# Patient Record
Sex: MALE | Race: WHITE | NOT HISPANIC OR LATINO | URBAN - METROPOLITAN AREA
[De-identification: names, ages, dates, MRNs, and addresses within clinical notes are randomized per-mention and may not be internally consistent; named-entity substitution may affect disease eponyms.]

---

## 2018-02-19 ENCOUNTER — TRANSFERRED RECORDS (OUTPATIENT)
Dept: HEALTH INFORMATION MANAGEMENT | Facility: CLINIC | Age: 23
End: 2018-02-19

## 2018-03-05 ENCOUNTER — TRANSFERRED RECORDS (OUTPATIENT)
Dept: HEALTH INFORMATION MANAGEMENT | Facility: CLINIC | Age: 23
End: 2018-03-05

## 2018-03-12 ENCOUNTER — TRANSFERRED RECORDS (OUTPATIENT)
Dept: HEALTH INFORMATION MANAGEMENT | Facility: CLINIC | Age: 23
End: 2018-03-12

## 2018-03-21 ENCOUNTER — TRANSFERRED RECORDS (OUTPATIENT)
Dept: HEALTH INFORMATION MANAGEMENT | Facility: CLINIC | Age: 23
End: 2018-03-21

## 2018-03-27 ENCOUNTER — MEDICAL CORRESPONDENCE (OUTPATIENT)
Dept: HEALTH INFORMATION MANAGEMENT | Facility: CLINIC | Age: 23
End: 2018-03-27

## 2018-04-05 ENCOUNTER — OFFICE VISIT (OUTPATIENT)
Dept: ORTHOPEDICS | Facility: CLINIC | Age: 23
End: 2018-04-05
Payer: COMMERCIAL

## 2018-04-05 VITALS — HEIGHT: 71 IN | BODY MASS INDEX: 24.12 KG/M2 | WEIGHT: 172.3 LBS

## 2018-04-05 DIAGNOSIS — M89.9 BONE LESION: Primary | ICD-10-CM

## 2018-04-05 NOTE — PROGRESS NOTES
[facility], Orthopaedic Oncology Surgery Consultation  by Yaya Devries M.D.    [place] VANDANA Duncan MRN# [id number]   Age: 22 year old YOB: 1995     Requesting physician: Mark Soto DPM, [facility]  Hardy Barth, PCP  Mark Mesa, Radiology [facility]            Assessment and Plan:   Assessment:  #1 right foot fourth metatarsal lesion.  Differential diagnosis would be osteoid osteoma and less likely other types of benign lesions such as osteoblastoma.  Aging is typical for an osteoid osteoma however.     Plan:  I have discussed management management options consisting of either radiofrequency ablation of the osteoid osteoma or serial observation.  Natural history of osteoid osteomas is for pain to resolve spontaneously although this may take 1-2 years time.  Pain can be managed with anti-inflammatory agents or aspirin.  However most cases, patients will like to proceed with a radiofrequency ablation procedure as this is 90-95% effective in completely alleviating pain.  This would be an outpatient procedure using intraoperative CT guidance to place a radio frequency probe into the lesion for eradication.  We discussed risks benefits alternatives of the surgical procedure.    Patient will consider his treatment options and will make a decision notify us of how he would like to proceed.           History of Present Illness:   22 year old male  chief complaint    1 year hx of progressive pain in R foot.  Saw MD on 2/2018, XR / MRI done in 3/5/18. Saw podiatrist Dr. Soto at [facility] who initially advised surgical resection with bone grafting, then advised 2nd opinion here at .    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         Physical Exam:  "    EXAMINATION pertinent findings:   VITAL SIGNS: Height 1.81 m (5' 11.25\"), weight 78.2 kg (172 lb 4.8 oz).  Body mass index is 23.86 kg/(m^2).  RESP: non labored breathing   ABD: benign   SKIN: grossly normal   LYMPHATIC: grossly normal   NEURO: grossly normal   VASCULAR: satisfactory perfusion of all extremities   MUSCULOSKELETAL:   Gait : normal  Hips, knees full ROM.  R foot: no palpable mass.  No focal tenderness or swelling.  No erythema             Data:   All laboratory data reviewed  All imaging studies reviewed by me               Yaya Devries MD  [facility] Family Professor  Oncology and Adult Reconstructive Surgery  Dept Orthopaedic Surgery, [facility]  [phone] office, [phone] pager  [email or web address]            DATA for DOCUMENTATION:         Past Medical History:   There is no problem list on file for this patient.    No past medical history on file.    Also see scanned health assessment forms.       Past Surgical History:   No past surgical history on file.         Social History:     Social History     Social History     Marital status: Single     Spouse name: N/A     Number of children: N/A     Years of education: N/A     Occupational History     Not on file.     Social History Main Topics     Smoking status: Never Smoker     Smokeless tobacco: Never Used     Alcohol use Yes     Drug use: No     Sexual activity: Yes     Partners: Female     Birth control/ protection: Pull-out method, Condom     Other Topics Concern     Not on file     Social History Narrative     No narrative on file            Family History:       Family History   Problem Relation Age of Onset     DIABETES Maternal Grandmother      Hypertension Maternal Grandmother      Colon Cancer Maternal Grandmother      Hypertension Paternal Grandmother      Hypertension Maternal Grandfather      Hypertension Paternal Grandfather      Hyperlipidemia Father             Medications:     Current Outpatient Prescriptions "   Medication Sig     Acetaminophen (TYLENOL PO)      No current facility-administered medications for this visit.               Review of Systems:   A comprehensive 10 point review of systems (constitutional, ENT, cardiac, peripheral vascular, lymphatic, respiratory, GI, , Musculoskeletal, skin, Neurological) was performed and found to be negative except as described in this note.     See intake form completed by patient    Answers for HPI/ROS submitted by the patient on 4/4/2018   General Symptoms: No  Skin Symptoms: No  HENT Symptoms: No  EYE SYMPTOMS: No  HEART SYMPTOMS: No  LUNG SYMPTOMS: No  INTESTINAL SYMPTOMS: No  URINARY SYMPTOMS: No  REPRODUCTIVE SYMPTOMS: No  SKELETAL SYMPTOMS: No  BLOOD SYMPTOMS: No  NERVOUS SYSTEM SYMPTOMS: No  MENTAL HEALTH SYMPTOMS: No

## 2018-04-05 NOTE — LETTER
4/5/2018       RE: Greg Duncan  [address] [place] [address]     Dear Colleague,    Thank you for referring your patient, Greg Duncan, to the [facility] ORTHOP[facility] at [facility]. Please see a copy of my visit note below.        [facility], Orthopaedic Oncology Surgery Consultation  by Yaya Devries M.D.    Greg Duncan MRN# [id number]   Age: 22 year old YOB: 1995     Requesting physician: Makr Soto DPM, [facility]  Hardy Barth, PCP  Mark Mesa, Radiology [facility]            Assessment and Plan:   Assessment:  #1 right foot fourth metatarsal lesion.  Differential diagnosis would be osteoid osteoma and less likely other types of benign lesions such as osteoblastoma.  Aging is typical for an osteoid osteoma however.     Plan:  I have discussed management management options consisting of either radiofrequency ablation of the osteoid osteoma or serial observation.  Natural history of osteoid osteomas is for pain to resolve spontaneously although this may take 1-2 years time.  Pain can be managed with anti-inflammatory agents or aspirin.  However most cases, patients will like to proceed with a radiofrequency ablation procedure as this is 90-95% effective in completely alleviating pain.  This would be an outpatient procedure using intraoperative CT guidance to place a radio frequency probe into the lesion for eradication.  We discussed risks benefits alternatives of the surgical procedure.    Patient will consider his treatment options and will make a decision notify us of how he would like to proceed.           History of Present Illness:   22 year old male  chief complaint    1 year hx of progressive pain in R foot.  Saw MD on 2/2018, XR / MRI done in 3/5/18. Saw podiatrist Dr. Soto at [facility] who initially advised surgical resection with bone grafting, then advised 2nd opinion here at .    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         Physical Exam:     EXAMINATION pertinent findings:   VITAL SIGNS: Height 1.81 m (5' 11.25\"), weight 78.2 kg (172 lb 4.8 oz).  Body mass index is 23.86 kg/(m^2).  RESP: non labored breathing   ABD: benign   SKIN: grossly normal   LYMPHATIC: grossly normal   NEURO: grossly normal   VASCULAR: satisfactory perfusion of all extremities   MUSCULOSKELETAL:   Gait : normal  Hips, knees full ROM.  R foot: no palpable mass.  No focal tenderness or swelling.  No erythema             Data:   All laboratory data reviewed  All imaging studies reviewed by me               Yaya Devries MD  [facility] Family Professor  Oncology and Adult Reconstructive Surgery  Dept Orthopaedic Surgery, [facility]  [phone] office, [phone] pager  [email or web address]            DATA for DOCUMENTATION:         Past Medical History:   There is no problem list on file for this patient.    No past medical history on file.    Also see scanned health assessment forms.       Past Surgical History:   No past surgical history on file.         Social History:     Social History     Social History     Marital status: Single     Spouse name: N/A     Number of children: N/A     Years of education: N/A     Occupational History     Not on file.     Social History Main Topics     Smoking status: Never Smoker     Smokeless tobacco: Never Used     Alcohol use Yes     Drug use: No     Sexual activity: Yes     Partners: Female     Birth control/ protection: Pull-out method, Condom     Other Topics Concern     Not on file     Social History Narrative     No narrative on file            Family History:       Family History   Problem Relation Age of Onset     DIABETES Maternal Grandmother  "     Hypertension Maternal Grandmother      Colon Cancer Maternal Grandmother      Hypertension Paternal Grandmother      Hypertension Maternal Grandfather      Hypertension Paternal Grandfather      Hyperlipidemia Father             Medications:     Current Outpatient Prescriptions   Medication Sig     Acetaminophen (TYLENOL PO)      No current facility-administered medications for this visit.               Review of Systems:   A comprehensive 10 point review of systems (constitutional, ENT, cardiac, peripheral vascular, lymphatic, respiratory, GI, , Musculoskeletal, skin, Neurological) was performed and found to be negative except as described in this note.     See intake form completed by patient      Again, thank you for allowing me to participate in the care of your patient.      Sincerely,    Yaya Devries MD

## 2018-04-05 NOTE — MR AVS SNAPSHOT
"              After Visit Summary   4/5/2018    Greg Duncan    MRN: [id number]           Patient Information     Date Of Birth          1995        Visit Information        Provider Department      4/5/2018 12:30 PM Yaya Devries MD [facility]        Today's Diagnoses     Bone lesion    -  1       Follow-ups after your visit        Who to contact     Please call your clinic at [phone] to:    Ask questions about your health    Make or cancel appointments    Discuss your medicines    Learn about your test results    Speak to your doctor            Additional Information About Your Visit        Southtreehart Information     7fgame gives you secure access to your electronic health record. If you see a primary care provider, you can also send messages to your care team and make appointments. If you have questions, please call your primary care clinic.  If you do not have a primary care provider, please call [phone] and they will assist you.      7fgame is an electronic gateway that provides easy, online access to your medical records. With 7fgame, you can request a clinic appointment, read your test results, renew a prescription or communicate with your care team.     To access your existing account, please contact your [facility] or call [phone] for assistance.        Care EveryWhere ID     This is your Care EveryWhere ID. This could be used by other organizations to access your [place] medical records  [id number]        Your Vitals Were     Height BMI (Body Mass Index)                1.81 m (5' 11.25\") 23.86 kg/m2           Blood Pressure from Last 3 Encounters:   No data found for BP    Weight from Last 3 Encounters:   04/05/18 78.2 kg (172 lb 4.8 oz)              Today, you had the following     No orders found for display       Primary Care Provider Office Phone # Fax #    Hardy Barth [phone]       [facility] " EMILI [facility] [address]  [facility] [address]        Equal Access to Services     CHRISTOPHEDDIE INDER : Hadii kel gonzalez hadniurkadarwin Sojeanetteali, waaxda luqadaha, qaybta kaalmada jordan, zuleyka shepardarleneantionette boyer. So [facility] [phone].    ATENCIÓN: Si habla español, tiene a quezada disposición servicios gratuitos de asistencia lingüística. Llame al [phone].    We comply with applicable federal civil rights laws and [place] laws. We do not discriminate on the basis of race, color, national origin, age, disability, sex, sexual orientation, or gender identity.            Thank you!     Thank you for choosing [facility]THOP[facility]IC [facility]  for your care. Our goal is always to provide you with excellent care. Hearing back from our patients is one way we can continue to improve our services. Please take a few minutes to complete the written survey that you may receive in the mail after your visit with us. Thank you!             Your Updated Medication List - Protect others around you: Learn how to safely use, store and throw away your medicines at www.disposemymeds.org.          This list is accurate as of 4/5/18  8:55 PM.  Always use your most recent med list.                   Brand Name Dispense Instructions for use Diagnosis    TYLENOL PO

## 2018-04-09 DIAGNOSIS — D16.9 OSTEOID OSTEOMA: Primary | ICD-10-CM

## 2018-04-11 ENCOUNTER — DOCUMENTATION ONLY (OUTPATIENT)
Dept: ORTHOPEDICS | Facility: CLINIC | Age: 23
End: 2018-04-11

## 2018-04-11 NOTE — PROGRESS NOTES
Steph-op worksheet received and forwarded to surgery scheduling. Patient has been scheduled for surgery for 4/20/18 with Dr. Devries at [place] per Joanne Iraheta RN to Dr. Devries.

## 2018-04-19 ENCOUNTER — ANESTHESIA EVENT (OUTPATIENT)
Dept: SURGERY | Facility: CLINIC | Age: 23
End: 2018-04-19
Payer: COMMERCIAL

## 2018-04-19 NOTE — ANESTHESIA PREPROCEDURE EVALUATION
Anesthesia Evaluation     . Pt has not had prior anesthetic            ROS/MED HX    ENT/Pulmonary:  - neg pulmonary ROS     Neurologic:  - neg neurologic ROS     Cardiovascular:  - neg cardiovascular ROS       METS/Exercise Tolerance:  >4 METS   Hematologic:  - neg hematologic  ROS       Musculoskeletal: Comment: 4th metatarsal soft tissue mass        GI/Hepatic:  - neg GI/hepatic ROS       Renal/Genitourinary:  - ROS Renal section negative       Endo:  - neg endo ROS       Psychiatric:  - neg psychiatric ROS       Infectious Disease:  - neg infectious disease ROS       Malignancy:         Other:                   Procedure: Procedure(s):  Radiofrequency Ablation Of Lesion Right 4th Metatarsal  (O-Arm)  - Wound Class:     HPI: Greg Duncan is a 22 year old male with the following history who presents for ablation of soft tissue mass on 4th toe.    PMHx/PSHx:  History reviewed. No pertinent past medical history.    History reviewed. No pertinent surgical history.      No current facility-administered medications on file prior to encounter.   Current Outpatient Prescriptions on File Prior to Encounter:  Acetaminophen (TYLENOL PO) Take 500 mg by mouth every 4 hours as needed        Social Hx:   Social History   Substance Use Topics     Smoking status: Never Smoker     Smokeless tobacco: Never Used     Alcohol use Yes      Comment: 2 per mo       Allergies: No Known Allergies      NPO Status: Per ASA Guidelines    Labs:    Blood Bank:  No results found for: ABO, RH, AS  BMP:  No results for input(s): NA, POTASSIUM, CHLORIDE, CO2, BUN, CR, GLC, KELLEN in the last 30420 hours.  CBC:   No results for input(s): WBC, RBC, HGB, HCT, MCV, MCH, MCHC, RDW, PLT in the last 31651 hours.  Coags:  No results for input(s): INR, PTT, FIBR in the last 53465 hours.            Physical Exam  Normal systems: cardiovascular, pulmonary and dental    Airway   Mallampati: I  Neck ROM: full    Dental     Cardiovascular       Pulmonary                      Anesthesia Plan      History & Physical Review      ASA Status:  1 .    NPO Status:  > 8 hours    Plan for General and LMA with Intravenous induction. Maintenance will be Balanced.    PONV prophylaxis:  Ondansetron (or other 5HT-3) and Dexamethasone or Solumedrol       Postoperative Care  Postoperative pain management:  IV analgesics and Oral pain medications.      Consents  Anesthetic plan, risks, benefits and alternatives discussed with:  Patient..                          .

## 2018-04-20 ENCOUNTER — ANESTHESIA (OUTPATIENT)
Dept: SURGERY | Facility: CLINIC | Age: 23
End: 2018-04-20
Payer: COMMERCIAL

## 2018-04-20 ENCOUNTER — HOSPITAL ENCOUNTER (OUTPATIENT)
Facility: CLINIC | Age: 23
Discharge: HOME | End: 2018-04-20
Attending: ORTHOPAEDIC SURGERY | Admitting: ORTHOPAEDIC SURGERY
Payer: COMMERCIAL

## 2018-04-20 ENCOUNTER — APPOINTMENT (OUTPATIENT)
Dept: GENERAL RADIOLOGY | Facility: CLINIC | Age: 23
End: 2018-04-20
Attending: ORTHOPAEDIC SURGERY
Payer: COMMERCIAL

## 2018-04-20 VITALS
SYSTOLIC BLOOD PRESSURE: 133 MMHG | TEMPERATURE: 97.7 F | HEART RATE: 67 BPM | WEIGHT: 169.09 LBS | RESPIRATION RATE: 12 BRPM | BODY MASS INDEX: 23.67 KG/M2 | DIASTOLIC BLOOD PRESSURE: 79 MMHG | OXYGEN SATURATION: 97 % | HEIGHT: 71 IN

## 2018-04-20 LAB — GLUCOSE BLDC GLUCOMTR-MCNC: 84 MG/DL (ref 70–99)

## 2018-04-20 PROCEDURE — 36000076 ZZH SURGERY LEVEL 6 EA 15 ADDTL MIN - UMMC: Performed by: ORTHOPAEDIC SURGERY

## 2018-04-20 PROCEDURE — 71000027 ZZH RECOVERY PHASE 2 EACH 15 MINS: Performed by: ORTHOPAEDIC SURGERY

## 2018-04-20 PROCEDURE — 40000278 XR SURGERY CARM FLUORO LESS THAN 5 MIN: Mod: TC

## 2018-04-20 PROCEDURE — 25000132 ZZH RX MED GY IP 250 OP 250 PS 637: Performed by: STUDENT IN AN ORGANIZED HEALTH CARE EDUCATION/TRAINING PROGRAM

## 2018-04-20 PROCEDURE — 25000125 ZZHC RX 250: Performed by: STUDENT IN AN ORGANIZED HEALTH CARE EDUCATION/TRAINING PROGRAM

## 2018-04-20 PROCEDURE — C1888 ENDOVAS NON-CARDIAC ABL CATH: HCPCS | Performed by: ORTHOPAEDIC SURGERY

## 2018-04-20 PROCEDURE — 37000008 ZZH ANESTHESIA TECHNICAL FEE, 1ST 30 MIN: Performed by: ORTHOPAEDIC SURGERY

## 2018-04-20 PROCEDURE — 25000566 ZZH SEVOFLURANE, EA 15 MIN: Performed by: ORTHOPAEDIC SURGERY

## 2018-04-20 PROCEDURE — 40000170 ZZH STATISTIC PRE-PROCEDURE ASSESSMENT II: Performed by: ORTHOPAEDIC SURGERY

## 2018-04-20 PROCEDURE — 37000009 ZZH ANESTHESIA TECHNICAL FEE, EACH ADDTL 15 MIN: Performed by: ORTHOPAEDIC SURGERY

## 2018-04-20 PROCEDURE — 25000125 ZZHC RX 250: Performed by: ORTHOPAEDIC SURGERY

## 2018-04-20 PROCEDURE — 36000078 ZZH SURGERY LEVEL 6 W FLUORO 1ST 30 MIN - UMMC: Performed by: ORTHOPAEDIC SURGERY

## 2018-04-20 PROCEDURE — 82962 GLUCOSE BLOOD TEST: CPT

## 2018-04-20 PROCEDURE — 25000128 H RX IP 250 OP 636: Performed by: ORTHOPAEDIC SURGERY

## 2018-04-20 PROCEDURE — 25000128 H RX IP 250 OP 636: Performed by: STUDENT IN AN ORGANIZED HEALTH CARE EDUCATION/TRAINING PROGRAM

## 2018-04-20 PROCEDURE — 71000014 ZZH RECOVERY PHASE 1 LEVEL 2 FIRST HR: Performed by: ORTHOPAEDIC SURGERY

## 2018-04-20 PROCEDURE — 27210794 ZZH OR GENERAL SUPPLY STERILE: Performed by: ORTHOPAEDIC SURGERY

## 2018-04-20 DEVICE — IMP WIRE KIRSCHNER 1.6MM 0.062X9" SGL END TROCAR KM71-153: Type: IMPLANTABLE DEVICE | Site: FEMUR | Status: FUNCTIONAL

## 2018-04-20 RX ORDER — SODIUM CHLORIDE, SODIUM LACTATE, POTASSIUM CHLORIDE, CALCIUM CHLORIDE 600; 310; 30; 20 MG/100ML; MG/100ML; MG/100ML; MG/100ML
INJECTION, SOLUTION INTRAVENOUS CONTINUOUS PRN
Status: DISCONTINUED | OUTPATIENT
Start: 2018-04-20 | End: 2018-04-20

## 2018-04-20 RX ORDER — HYDROMORPHONE HYDROCHLORIDE 1 MG/ML
.3-.5 INJECTION, SOLUTION INTRAMUSCULAR; INTRAVENOUS; SUBCUTANEOUS EVERY 5 MIN PRN
Status: DISCONTINUED | OUTPATIENT
Start: 2018-04-20 | End: 2018-04-20 | Stop reason: HOSPADM

## 2018-04-20 RX ORDER — ONDANSETRON 2 MG/ML
4 INJECTION INTRAMUSCULAR; INTRAVENOUS EVERY 30 MIN PRN
Status: DISCONTINUED | OUTPATIENT
Start: 2018-04-20 | End: 2018-04-20 | Stop reason: HOSPADM

## 2018-04-20 RX ORDER — SODIUM CHLORIDE, SODIUM LACTATE, POTASSIUM CHLORIDE, CALCIUM CHLORIDE 600; 310; 30; 20 MG/100ML; MG/100ML; MG/100ML; MG/100ML
INJECTION, SOLUTION INTRAVENOUS CONTINUOUS
Status: DISCONTINUED | OUTPATIENT
Start: 2018-04-20 | End: 2018-04-20 | Stop reason: HOSPADM

## 2018-04-20 RX ORDER — LIDOCAINE 40 MG/G
CREAM TOPICAL
Status: DISCONTINUED | OUTPATIENT
Start: 2018-04-20 | End: 2018-04-20 | Stop reason: HOSPADM

## 2018-04-20 RX ORDER — DEXAMETHASONE SODIUM PHOSPHATE 4 MG/ML
INJECTION, SOLUTION INTRA-ARTICULAR; INTRALESIONAL; INTRAMUSCULAR; INTRAVENOUS; SOFT TISSUE PRN
Status: DISCONTINUED | OUTPATIENT
Start: 2018-04-20 | End: 2018-04-20

## 2018-04-20 RX ORDER — CEFAZOLIN SODIUM 2 G/100ML
2 INJECTION, SOLUTION INTRAVENOUS
Status: COMPLETED | OUTPATIENT
Start: 2018-04-20 | End: 2018-04-20

## 2018-04-20 RX ORDER — PROPOFOL 10 MG/ML
INJECTION, EMULSION INTRAVENOUS PRN
Status: DISCONTINUED | OUTPATIENT
Start: 2018-04-20 | End: 2018-04-20

## 2018-04-20 RX ORDER — ONDANSETRON 2 MG/ML
INJECTION INTRAMUSCULAR; INTRAVENOUS PRN
Status: DISCONTINUED | OUTPATIENT
Start: 2018-04-20 | End: 2018-04-20

## 2018-04-20 RX ORDER — GABAPENTIN 100 MG/1
300 CAPSULE ORAL ONCE
Status: COMPLETED | OUTPATIENT
Start: 2018-04-20 | End: 2018-04-20

## 2018-04-20 RX ORDER — ONDANSETRON 4 MG/1
4 TABLET, ORALLY DISINTEGRATING ORAL EVERY 30 MIN PRN
Status: DISCONTINUED | OUTPATIENT
Start: 2018-04-20 | End: 2018-04-20 | Stop reason: HOSPADM

## 2018-04-20 RX ORDER — AMOXICILLIN 250 MG
1-2 CAPSULE ORAL 2 TIMES DAILY
Qty: 30 TABLET | Refills: 0 | Status: SHIPPED | OUTPATIENT
Start: 2018-04-20

## 2018-04-20 RX ORDER — LABETALOL HYDROCHLORIDE 5 MG/ML
10 INJECTION, SOLUTION INTRAVENOUS
Status: DISCONTINUED | OUTPATIENT
Start: 2018-04-20 | End: 2018-04-20 | Stop reason: HOSPADM

## 2018-04-20 RX ORDER — KETOROLAC TROMETHAMINE 30 MG/ML
INJECTION, SOLUTION INTRAMUSCULAR; INTRAVENOUS PRN
Status: DISCONTINUED | OUTPATIENT
Start: 2018-04-20 | End: 2018-04-20

## 2018-04-20 RX ORDER — OXYCODONE HYDROCHLORIDE 5 MG/1
5-10 TABLET ORAL
Qty: 30 TABLET | Refills: 0 | Status: SHIPPED | OUTPATIENT
Start: 2018-04-20

## 2018-04-20 RX ORDER — BUPIVACAINE HYDROCHLORIDE 2.5 MG/ML
INJECTION, SOLUTION INFILTRATION; PERINEURAL PRN
Status: DISCONTINUED | OUTPATIENT
Start: 2018-04-20 | End: 2018-04-20 | Stop reason: HOSPADM

## 2018-04-20 RX ORDER — ACETAMINOPHEN 325 MG/1
975 TABLET ORAL ONCE
Status: COMPLETED | OUTPATIENT
Start: 2018-04-20 | End: 2018-04-20

## 2018-04-20 RX ORDER — FENTANYL CITRATE 50 UG/ML
25-50 INJECTION, SOLUTION INTRAMUSCULAR; INTRAVENOUS
Status: DISCONTINUED | OUTPATIENT
Start: 2018-04-20 | End: 2018-04-20 | Stop reason: HOSPADM

## 2018-04-20 RX ORDER — NALOXONE HYDROCHLORIDE 0.4 MG/ML
.1-.4 INJECTION, SOLUTION INTRAMUSCULAR; INTRAVENOUS; SUBCUTANEOUS
Status: DISCONTINUED | OUTPATIENT
Start: 2018-04-20 | End: 2018-04-20 | Stop reason: HOSPADM

## 2018-04-20 RX ORDER — EPHEDRINE SULFATE 50 MG/ML
INJECTION, SOLUTION INTRAMUSCULAR; INTRAVENOUS; SUBCUTANEOUS PRN
Status: DISCONTINUED | OUTPATIENT
Start: 2018-04-20 | End: 2018-04-20

## 2018-04-20 RX ORDER — FENTANYL CITRATE 50 UG/ML
INJECTION, SOLUTION INTRAMUSCULAR; INTRAVENOUS PRN
Status: DISCONTINUED | OUTPATIENT
Start: 2018-04-20 | End: 2018-04-20

## 2018-04-20 RX ADMIN — SODIUM CHLORIDE, POTASSIUM CHLORIDE, SODIUM LACTATE AND CALCIUM CHLORIDE: 600; 310; 30; 20 INJECTION, SOLUTION INTRAVENOUS at 08:22

## 2018-04-20 RX ADMIN — GABAPENTIN 300 MG: 300 CAPSULE ORAL at 06:04

## 2018-04-20 RX ADMIN — FENTANYL CITRATE 100 MCG: 50 INJECTION, SOLUTION INTRAMUSCULAR; INTRAVENOUS at 08:29

## 2018-04-20 RX ADMIN — ONDANSETRON 4 MG: 2 INJECTION INTRAMUSCULAR; INTRAVENOUS at 09:53

## 2018-04-20 RX ADMIN — DEXAMETHASONE SODIUM PHOSPHATE 4 MG: 4 INJECTION, SOLUTION INTRAMUSCULAR; INTRAVENOUS at 08:34

## 2018-04-20 RX ADMIN — Medication 5 MG: at 08:31

## 2018-04-20 RX ADMIN — FENTANYL CITRATE 50 MCG: 50 INJECTION, SOLUTION INTRAMUSCULAR; INTRAVENOUS at 09:03

## 2018-04-20 RX ADMIN — MIDAZOLAM 2 MG: 1 INJECTION INTRAMUSCULAR; INTRAVENOUS at 08:22

## 2018-04-20 RX ADMIN — CEFAZOLIN SODIUM 2 G: 2 INJECTION, SOLUTION INTRAVENOUS at 08:34

## 2018-04-20 RX ADMIN — ACETAMINOPHEN 975 MG: 325 TABLET ORAL at 06:04

## 2018-04-20 RX ADMIN — PROPOFOL 200 MG: 10 INJECTION, EMULSION INTRAVENOUS at 08:29

## 2018-04-20 RX ADMIN — KETOROLAC TROMETHAMINE 30 MG: 30 INJECTION, SOLUTION INTRAMUSCULAR at 09:53

## 2018-04-20 NOTE — BRIEF OP NOTE
Orthopaedic Surgery Brief Op-Note      Patient: Greg Duncan  : 1995  Date of Service: 2018 10:00 AM    Pre-operative Diagnosis: Lesion Right 4th Metatarsal, Osteoid Osteoma   Post-operative Diagnosis: same    Procedure(s) Performed: Procedure(s):  Radiofrequency Ablation Of Lesion Right 4th Metatarsal - Wound Class: I-Clean    Staff:   Assistants:   Junaid Billy MD    Anesthesia: General  EBL: 5 cc  UOP: see anesthesia record  Tourniquet Time: none    Implants:   none       Drains: none  Intra-op Labs/Cxs/Specimens: None  Complications: No apparent complications during procedure  Findings: Please see dictated operative note for details    Disposition: Stable to PACU, then home today.        Assessment/Plan:   Greg Duncan is a 22 year old male s/p Procedure(s):  Radiofrequency Ablation Of Lesion Right 4th Metatarsal - Wound Class: I-Clean on 2018 with Dr. Devries.    Activity: Up with assist.  Weight bearing status: AT   Antibiotics: Ancef x 24 hours.  Diet: Begin with clear fluids and progress diet as tolerated.  Bowel regimen. Anti-emetics PRN.    DVT prophylaxis: none req  Dressing: leave in place for 5 days  Bracing/Splinting: none req  Pain management: oxy 5 mg x 30 tabs  Physical Therapy/Occupational Therapy  Follow-up: Clinic with Dr. Devries in 4 weeks, x-rays needed.    No future appointments.    Disposition: home today    Signed:   Junaid Billy MD  Adult Reconstructive Surgery Fellow  Dept Orthopaedic Surgery, [facility]

## 2018-04-20 NOTE — ANESTHESIA POSTPROCEDURE EVALUATION
Patient: Greg Duncan    Procedure(s):  Radiofrequency Ablation Of Lesion Right 4th Metatarsal - Wound Class: I-Clean    Diagnosis:Lesion Right 4th Metatarsal, Osteoid Osteoma   Diagnosis Additional Information: No value filed.    Anesthesia Type:  General, LMA    Note:  Anesthesia Post Evaluation    Patient location during evaluation: PACU  Patient participation: Able to fully participate in evaluation  Level of consciousness: awake and alert  Pain management: adequate  Airway patency: patent  Cardiovascular status: acceptable  Respiratory status: acceptable  Hydration status: acceptable  PONV: none             Last vitals:  Vitals:    04/20/18 1100 04/20/18 1115 04/20/18 1130   BP: 131/87  133/79   Pulse:      Resp: 12 10 12   Temp: 36.4  C (97.5  F)  36.5  C (97.7  F)   SpO2: 99% 98% 97%         Electronically Signed By: Jeannine Horne MD  April 20, 2018  11:58 AM

## 2018-04-20 NOTE — ANESTHESIA CARE TRANSFER NOTE
Patient: Greg Duncan    Procedure(s):  Radiofrequency Ablation Of Lesion Right 4th Metatarsal - Wound Class: I-Clean    Diagnosis: Lesion Right 4th Metatarsal, Osteoid Osteoma   Diagnosis Additional Information: No value filed.    Anesthesia Type:   General, LMA     Note:  Airway :Face Mask  Patient transferred to:PACU  Comments: Patient resting comfortably, breathing spontaneously.  Jose Regan  Handoff Report: Identifed the Patient, Identified the Reponsible Provider, Reviewed the pertinent medical history, Discussed the surgical course, Reviewed Intra-OP anesthesia mangement and issues during anesthesia, Set expectations for post-procedure period and Allowed opportunity for questions and acknowledgement of understanding      Vitals: (Last set prior to Anesthesia Care Transfer)    CRNA VITALS  4/20/2018 0934 - 4/20/2018 1013      4/20/2018             Pulse: 115    SpO2: 97 %    Resp Rate (observed): (!)  6                Electronically Signed By: Jose Regan MD  April 20, 2018  10:13 AM

## 2018-04-20 NOTE — IP AVS SNAPSHOT
MRN:[id number]                      After Visit Summary   4/20/2018    Greg Duncan    MRN: [id number]           Thank you!     Thank you for choosing [facility] for your care. Our goal is always to provide you with excellent care. Hearing back from our patients is one way we can continue to improve our services. Please take a few minutes to complete the written survey that you may receive in the mail after you visit with us. Thank you!        Patient Information     Date Of Birth          1995        About your hospital stay     You were admitted on:  April 20, 2018 You last received care in the:  [facility] PACU    You were discharged on:  April 20, 2018       Who to Call     For medical emergencies, please call 911.  For non-urgent questions about your medical care, please call your primary care provider or clinic, [phone]  For questions related to your surgery, please call your surgery clinic        Attending Provider     Provider Specialty    Yaya Devries MD Orthopedics       Primary Care Provider Office Phone # Fax #    Hardy Barth [phone]      After Care Instructions     Discharge Instructions       SAME DAY SURGERY DISCHARGE INSTRUCTIONS:    Discharge disposition: Discharge to home     Diet: Advance to a regular diet as tolerated     Activity: Activity as tolerated     Follow-up: Please call [phone], option #1, during weekday business hours 8 am - 4:30 pm. Please schedule your follow-up appointment according to the following parameters:  1) Biopsy or soft tissue excision procedures: schedule follow-up appointment 2 weeks after surgery  2) Arthroscopy or core decompression procedures: schedule follow-up appointment 4 weeks after surgery  3) Joint replacement procedures: schedule follow-up appointment 4 weeks after surgery    For 24 hours after surgery:  1. Get plenty of rest. A responsible adult must stay with you for at least 24 hours after you  leave the hospital.  2. Do not drive or use heavy equipment. If you have weakness or tingling, don't drive or use heavy equipment until this feeling goes away.  3. Do not drink alcohol.  4. Avoid strenuous or risky activities. Ask for help when climbing stairs.  5. You may feel lightheaded. If so, sit for a few minutes before standing. Have someone help you get up.   6. If you have nausea (feel sick to your stomach), drink only clear liquids such as apple juice, ginger ale, broth, or 7-UP. Rest may also help. Be sure to drink enough fluids. Move to a regular diet as you feel able.   7. You may have a slight fever. Call your doctor if your fever is over 100.4 F (38 C) (taken under the tongue) or lasts longer than 24 hours.   8. You may have a dry mouth, sore throat, muscle aches, or trouble sleeping. These should go away after 24 hours.   9. Do not make important or legal decisions.     Contact your provider immediately at [phone] for any of the following concerns:   1. Signs of infection such as fever (temperature >100.4 F or 38 C), growing tenderness at the surgery site, a large amount of drainage or bleeding, severe pain, foul-smelling drainage, redness, and/or swelling.  2. If it has been over 8-10 hours since surgery, and you still have not urinated (or passsed water).   3. Headache for over 24 hours.   4. Numbness, tingling, or weakness the day after surgery (if you had spinal anesthesia).     If you have any other concerns during normal business hours, please contact Dr. Devries's nurse, Joanne Iraheta RN, at [phone] during normal business hours 8 am - 5 pm (leave message as needed). If your concern is urgent, please page Joanne Iraheta RN at [phone] and key in your 10 digit phone number followed by the # sign after the beep. Alternatively, you may contact our nurse triage coordinators at [phone], option 3.    All after business hours concerns can be addressed by the orthopaedic resident on  call using the hospital  at [phone], option 4.    If you have an emergent concern, contact the Emergency Department at the [facility] - [phone] - or [facility] - [phone] - [place].            Ice to affected area       Ice pack to surgical site every 15-30 minutes per hour for 24 hours as desired to minimize swelling and pain.            No Alcohol       While using narcotic medication            No driving or operating machinery       While using narcotic medication            Shower       Patient may begin taking showers on the third day after surgery as long as the incision is dry. If there is drainage from the incision, cover the site with gauze and tape and keep it dry while showering. If there is not drainage, getting the wound wet while showering is acceptable. To dry, gently pat the incision dry and reapply dressing as per above instructions as needed.     For patients who underwent surgery on the upper extremity or below the knee, a bath can be taken at any time as long as the surgical site is kept out of the water.            Weight bearing - As tolerated           Wound care       Leave the initial bandage (dressing) on for at least 5 days or longer as long as the bandage is dry and intact.  If the incision is leaking any fluid or blood through or around the bandage, remove the existing bandage and replace it with gauze and tape. Be sure to wash your hands before and after, and use gloves if available. A bandage is only necessary for 3-4 days but it is best to keep it on longer provided that it remains clean, dry, and intact.    Sutures (stitches) will dissolve, however, the exposed suture (looks like a clear fishing line) at each end of the incision should be clipped flush with the skin using clean scissors or a nail clipper anytime after the third day after surgery. If desired, this can be done by clinic personnel at your follow up office visit instead.                   Further instructions from your care team       Same-Day Surgery   Adult Discharge Orders & Instructions     For 24 hours after surgery:  1. Get plenty of rest.  A responsible adult must stay with you for at least 24 hours after you leave the hospital.   2. Pain medication can slow your reflexes. Do not drive or use heavy equipment.  If you have weakness or tingling, don't drive or use heavy equipment until this feeling goes away.  3. Mixing alcohol and pain medication can cause dizziness and slow your breathing. It can even be fatal. Do not drink alcohol while taking pain medication.  4. Avoid strenuous or risky activities.  Ask for help when climbing stairs.   5. You may feel lightheaded.  If so, sit for a few minutes before standing.  Have someone help you get up.   6. If you have nausea (feel sick to your stomach), drink only clear liquids such as apple juice, ginger ale, broth or 7-Up.  Rest may also help.  Be sure to drink enough fluids.  Move to a regular diet as you feel able. Take pain medications with a small amount of solid food, such as toast or crackers, to avoid nausea.   7. A slight fever is normal. Call the doctor if your fever is over 100 F (37.7 C) (taken under the tongue) or lasts longer than 24 hours.  8. You may have a dry mouth, muscle aches, trouble sleeping or a sore throat.  These symptoms should go away after 24 hours.  9. Do not make important or legal decisions.   Pain Management:      1. Take pain medication (if prescribed) for pain as directed by your physician.        2. WARNING: If the pain medication you have been prescribed contains Tylenol  (acetaminophen), DO NOT take additional doses of Tylenol (acetaminophen).     Call your doctor for any of the followin.  Signs of infection (fever, growing tenderness at the surgery site, severe pain, a large amount of drainage or bleeding, foul-smelling drainage, redness, swelling).    2.  It has been over 8 to 10 hours since surgery and  "you are still not able to urinate (pee).    3.  Headache for over 24 hours.    4.  Numbness, tingling or weakness the day after surgery (if you had spinal anesthesia).  To contact a doctor, call _____________________________________ or:      [phone] and ask for the Resident On Call for:          __________________________________________ (answered 24 hours a day)      Emergency Department:  [facility]: [phone]  [facility]: [phone]               Rev. 10/2014       Pending Results     No orders found from 4/18/2018 to 4/21/2018.            Admission Information     Date & Time Provider Department Dept. Phone    4/20/2018 Yaya Devries MD [facility] PACU [phone]      Your Vitals Were     Blood Pressure Pulse Temperature Respirations Height Weight    132/83 67 98.3  F (36.8  C) (Oral) 11 1.803 m (5' 11\") 76.7 kg (169 lb 1.5 oz)    Pulse Oximetry BMI (Body Mass Index)                97% 23.58 kg/m2          MyChart Information     [facility] gives you secure access to your electronic health record. If you see a primary care provider, you can also send messages to your care team and make appointments. If you have questions, please call your primary care clinic.  If you do not have a primary care provider, please call [phone] and they will assist you.        Care EveryWhere ID     This is your Care EveryWhere ID. This could be used by other organizations to access your [facility] medical records  [id number]        Equal Access to Services     VALARIE LOVING : Ale Faulkner, deana batista, zuleyka prather. So [facility] [phone].    ATENCIÓN: Si habla español, tiene a quezada disposición servicios gratuitos de asistencia lingüística. Llame al [phone].    We comply with applicable federal civil rights laws and [place] laws. We do not discriminate on the basis of race, color, national origin, age, " disability, sex, sexual orientation, or gender identity.               Review of your medicines      START taking        Dose / Directions    oxyCODONE IR 5 MG tablet   Commonly known as:  ROXICODONE        Dose:  5-10 mg   Take 1-2 tablets (5-10 mg) by mouth every 3 hours as needed for pain or other (Moderate to Severe)   Quantity:  30 tablet   Refills:  0       senna-docusate 8.6-50 MG per tablet   Commonly known as:  SENOKOT-S;PERICOLACE        Dose:  1-2 tablet   Take 1-2 tablets by mouth 2 times daily Take while on oral narcotics to prevent or treat constipation.   Quantity:  30 tablet   Refills:  0         CONTINUE these medicines which have NOT CHANGED        Dose / Directions    TYLENOL PO        Dose:  500 mg   Take 500 mg by mouth every 4 hours as needed   Refills:  0            Where to get your medicines      Some of these will need a paper prescription and others can be bought over the counter. Ask your nurse if you have questions.     Bring a paper prescription for each of these medications     oxyCODONE IR 5 MG tablet    senna-docusate 8.6-50 MG per tablet                Protect others around you: Learn how to safely use, store and throw away your medicines at www.disposemymeds.org.        Information about OPIOIDS     PRESCRIPTION OPIOIDS: WHAT YOU NEED TO KNOW   You have a prescription for an opioid (narcotic) pain medicine. Opioids can cause addiction. If you have a history of chemical dependency of any type, you are at a higher risk of becoming addicted to opioids. Only take this medicine after all other options have been tried. Take it for as short a time and as few doses as possible.     Do not:    Drive. If you drive while taking these medicines, you could be arrested for driving under the influence (DUI).    Operate heavy machinery    Do any other dangerous activities while taking these medicines.     Drink any alcohol while taking these medicines.      Take with any other medicines that  contain acetaminophen. Read all labels carefully. Look for the word  acetaminophen  or  Tylenol.  Ask your pharmacist if you have questions or are unsure.    Store your pills in a secure place, locked if possible. We will not replace any lost or stolen medicine. If you don t finish your medicine, please throw away (dispose) as directed by your pharmacist. The [place] [employer]ution [employer] has more information about safe disposal: https://www.pca.[facility].[place].us/living-green/managing-unwanted-medications    All opioids tend to cause constipation. Drink plenty of water and eat foods that have a lot of fiber, such as fruits, vegetables, prune juice, apple juice and high-fiber cereal. Take a laxative (Miralax, milk of magnesia, Colace, Senna) if you don t move your bowels at least every other day.              Medication List: This is a list of all your medications and when to take them. Check marks below indicate your daily home schedule. Keep this list as a reference.      Medications           Morning Afternoon Evening Bedtime As Needed    oxyCODONE IR 5 MG tablet   Commonly known as:  ROXICODONE   Take 1-2 tablets (5-10 mg) by mouth every 3 hours as needed for pain or other (Moderate to Severe)                                senna-docusate 8.6-50 MG per tablet   Commonly known as:  SENOKOT-S;PERICOLACE   Take 1-2 tablets by mouth 2 times daily Take while on oral narcotics to prevent or treat constipation.                                TYLENOL PO   Take 500 mg by mouth every 4 hours as needed   Last time this was given:  975 mg on 4/20/2018  6:04 AM

## 2018-04-20 NOTE — IP AVS SNAPSHOT
[place] [id number] [facility] [address]    Phone:  [phone]                                       After Visit Summary   4/20/2018    Greg Duncan    MRN: [id number]           After Visit Summary Signature Page     I have received my discharge instructions, and my questions have been answered. I have discussed any challenges I see with this plan with the nurse or doctor.    ..........................................................................................................................................  Patient/Patient Representative Signature      ..........................................................................................................................................  Patient Representative Print Name and Relationship to Patient    ..................................................               ................................................  Date                                            Time    ..........................................................................................................................................  Reviewed by Signature/Title    ...................................................              ..............................................  Date                                                            Time

## 2018-04-20 NOTE — DISCHARGE INSTRUCTIONS
Same-Day Surgery   Adult Discharge Orders & Instructions     For 24 hours after surgery:  1. Get plenty of rest.  A responsible adult must stay with you for at least 24 hours after you leave the hospital.   2. Pain medication can slow your reflexes. Do not drive or use heavy equipment.  If you have weakness or tingling, don't drive or use heavy equipment until this feeling goes away.  3. Mixing alcohol and pain medication can cause dizziness and slow your breathing. It can even be fatal. Do not drink alcohol while taking pain medication.  4. Avoid strenuous or risky activities.  Ask for help when climbing stairs.   5. You may feel lightheaded.  If so, sit for a few minutes before standing.  Have someone help you get up.   6. If you have nausea (feel sick to your stomach), drink only clear liquids such as apple juice, ginger ale, broth or 7-Up.  Rest may also help.  Be sure to drink enough fluids.  Move to a regular diet as you feel able. Take pain medications with a small amount of solid food, such as toast or crackers, to avoid nausea.   7. A slight fever is normal. Call the doctor if your fever is over 100 F (37.7 C) (taken under the tongue) or lasts longer than 24 hours.  8. You may have a dry mouth, muscle aches, trouble sleeping or a sore throat.  These symptoms should go away after 24 hours.  9. Do not make important or legal decisions.   Pain Management:      1. Take pain medication (if prescribed) for pain as directed by your physician.        2. WARNING: If the pain medication you have been prescribed contains Tylenol  (acetaminophen), DO NOT take additional doses of Tylenol (acetaminophen).     Call your doctor for any of the followin.  Signs of infection (fever, growing tenderness at the surgery site, severe pain, a large amount of drainage or bleeding, foul-smelling drainage, redness, swelling).    2.  It has been over 8 to 10 hours since surgery and you are still not able to urinate (pee).    3.   Headache for over 24 hours.    4.  Numbness, tingling or weakness the day after surgery (if you had spinal anesthesia).  To contact a doctor, call _____________________________________ or:      [phone] and ask for the Resident On Call for:          __________________________________________ (answered 24 hours a day)      Emergency Department:  [facility]: [phone]  [facility]: [phone]               Rev. 10/2014

## 2018-04-28 NOTE — OP NOTE
Procedure Date: 2018      SURGEON:  Yaya Newberry MD       ASSISTANT:  Junaid Billy.      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      PROCEDURE:  Radiofrequency ablation of right fourth metatarsal osteoid osteoma.      DESCRIPTION OF PROCEDURE:  The patient was placed on the operating table in the supine position and the right foot was prepped and draped in sterile manner.  The osteoid osteoma was eccentrically located in the metatarsal.  Using the preoperative CT scan as a surgical map, I then used intraoperative fluoroscopic examination with the O-arm imaging technique to identify the location of the osteoid osteoma.  After standard prep and drape, a stab wound incision was then made over the fifth metatarsal, and an oblique approach to the fourth metatarsal osteoid osteoma was accomplished.  A K-wire was placed directly in the center portion of the lesion.  It had to be adjusted several times and repeat spins were necessary in order to confirm proper exact placement of the radiofrequency probe.  Once this was confirmed, the radiofrequency treatment encompassing a 90 degree Celsius temperature burn for duration of 6 minutes was performed.  Having completed this, the wound was then irrigated and closed.  Sterile dressings were applied.         YAYA NEWBERRY MD             D: 2018   T: 2018   MT: YAMILEX      Name:     ANY MCFADDEN   MRN:      [id number]        Account:        [id number]   :      1995           Procedure Date: 2018      Document: [id number]

## 2020-03-11 ENCOUNTER — HEALTH MAINTENANCE LETTER (OUTPATIENT)
Age: 25
End: 2020-03-11

## 2021-01-03 ENCOUNTER — HEALTH MAINTENANCE LETTER (OUTPATIENT)
Age: 26
End: 2021-01-03

## 2021-04-25 ENCOUNTER — HEALTH MAINTENANCE LETTER (OUTPATIENT)
Age: 26
End: 2021-04-25

## 2021-10-10 ENCOUNTER — HEALTH MAINTENANCE LETTER (OUTPATIENT)
Age: 26
End: 2021-10-10

## 2022-05-21 ENCOUNTER — HEALTH MAINTENANCE LETTER (OUTPATIENT)
Age: 27
End: 2022-05-21

## 2022-09-18 ENCOUNTER — HEALTH MAINTENANCE LETTER (OUTPATIENT)
Age: 27
End: 2022-09-18

## 2023-06-04 ENCOUNTER — HEALTH MAINTENANCE LETTER (OUTPATIENT)
Age: 28
End: 2023-06-04

## (undated) DEVICE — PACK LOWER EXTREMITY RIVERSIDE SOP32LEFSX

## (undated) DEVICE — GLOVE PROTEXIS BLUE W/NEU-THERA 8.0  2D73EB80

## (undated) DEVICE — SU PDS II 3-0 PS-1 18" Z683G

## (undated) DEVICE — GLOVE PROTEXIS W/NEU-THERA 8.0  2D73TE80

## (undated) DEVICE — GOWN IMPERVIOUS ZONED XLG 9041

## (undated) DEVICE — GLOVE PROTEXIS POWDER FREE 8.0 ORTHOPEDIC 2D73ET80

## (undated) DEVICE — STRAP KNEE/BODY 31143004

## (undated) DEVICE — LINEN ORTHO PACK 5446

## (undated) DEVICE — PREP SKIN SCRUB TRAY 4461A

## (undated) DEVICE — SUCTION MANIFOLD DORNOCH ULTRA CART UL-CL500

## (undated) DEVICE — GLOVE PROTEXIS BLUE W/NEU-THERA 7.5  2D73EB75

## (undated) DEVICE — DRAPE IOBAN INCISE 23X17" 6650EZ

## (undated) DEVICE — DRAPE O ARM TUBE 9732722

## (undated) DEVICE — PROBE RF ABLATION COOL TIP 15CM-1CM ACT1510

## (undated) DEVICE — LINEN GOWN X4 5410

## (undated) DEVICE — DRSG TEGADERM 4X4 3/4" 1626W

## (undated) DEVICE — BLADE KNIFE SURG 11 371111

## (undated) DEVICE — LINEN GOWN OVERSIZE 5408

## (undated) DEVICE — SOL WATER IRRIG 1000ML BOTTLE 2F7114

## (undated) DEVICE — PREP POVIDONE IODINE SCRUB 7.5% 120ML

## (undated) DEVICE — GLOVE PROTEXIS W/NEU-THERA 7.0  2D73TE70

## (undated) DEVICE — SU VICRYL 2-0 CP-2 27" UND J869H

## (undated) DEVICE — SPECIMEN CONTAINER URINE 90ML STERILE 75.1435.002

## (undated) DEVICE — BNDG ELASTIC 4"X5YDS UNSTERILE 6611-40

## (undated) DEVICE — Device

## (undated) DEVICE — PREP DURAPREP 26ML APL 8630

## (undated) DEVICE — ESU GROUND PAD UNIVERSAL W/O CORD

## (undated) DEVICE — MARKER SPHERZ PACK 5

## (undated) RX ORDER — DEXAMETHASONE SODIUM PHOSPHATE 4 MG/ML
INJECTION, SOLUTION INTRA-ARTICULAR; INTRALESIONAL; INTRAMUSCULAR; INTRAVENOUS; SOFT TISSUE
Status: DISPENSED
Start: 2018-04-20

## (undated) RX ORDER — EPHEDRINE SULFATE 50 MG/ML
INJECTION, SOLUTION INTRAMUSCULAR; INTRAVENOUS; SUBCUTANEOUS
Status: DISPENSED
Start: 2018-04-20

## (undated) RX ORDER — ACETAMINOPHEN 325 MG/1
TABLET ORAL
Status: DISPENSED
Start: 2018-04-20

## (undated) RX ORDER — ONDANSETRON 2 MG/ML
INJECTION INTRAMUSCULAR; INTRAVENOUS
Status: DISPENSED
Start: 2018-04-20

## (undated) RX ORDER — KETOROLAC TROMETHAMINE 30 MG/ML
INJECTION, SOLUTION INTRAMUSCULAR; INTRAVENOUS
Status: DISPENSED
Start: 2018-04-20

## (undated) RX ORDER — FENTANYL CITRATE 50 UG/ML
INJECTION, SOLUTION INTRAMUSCULAR; INTRAVENOUS
Status: DISPENSED
Start: 2018-04-20

## (undated) RX ORDER — CEFAZOLIN SODIUM 2 G/100ML
INJECTION, SOLUTION INTRAVENOUS
Status: DISPENSED
Start: 2018-04-20

## (undated) RX ORDER — BUPIVACAINE HYDROCHLORIDE 2.5 MG/ML
INJECTION, SOLUTION EPIDURAL; INFILTRATION; INTRACAUDAL
Status: DISPENSED
Start: 2018-04-20

## (undated) RX ORDER — GABAPENTIN 300 MG/1
CAPSULE ORAL
Status: DISPENSED
Start: 2018-04-20